# Patient Record
Sex: FEMALE | Race: WHITE
[De-identification: names, ages, dates, MRNs, and addresses within clinical notes are randomized per-mention and may not be internally consistent; named-entity substitution may affect disease eponyms.]

---

## 2021-08-03 ENCOUNTER — HOSPITAL ENCOUNTER (INPATIENT)
Dept: HOSPITAL 12 - ER | Age: 66
LOS: 3 days | Discharge: NURSING, REHAB OR LONG-TERM CARE | DRG: 536 | End: 2021-08-06
Payer: MEDICARE

## 2021-08-03 VITALS — WEIGHT: 150 LBS | HEIGHT: 67 IN | BODY MASS INDEX: 23.54 KG/M2

## 2021-08-03 DIAGNOSIS — Z79.01: ICD-10-CM

## 2021-08-03 DIAGNOSIS — E83.51: ICD-10-CM
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
## 2021-08-03 DIAGNOSIS — Z87.891: ICD-10-CM

## 2021-08-03 DIAGNOSIS — J45.909: ICD-10-CM

## 2021-08-03 DIAGNOSIS — R33.9: ICD-10-CM

## 2021-08-03 DIAGNOSIS — S32.591A: Primary | ICD-10-CM
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
## 2021-08-03 DIAGNOSIS — I48.91: ICD-10-CM

## 2021-08-03 DIAGNOSIS — R90.82: ICD-10-CM

## 2021-08-03 DIAGNOSIS — K59.00: ICD-10-CM

## 2021-08-03 DIAGNOSIS — Z20.822: ICD-10-CM

## 2021-08-03 DIAGNOSIS — S32.511A: ICD-10-CM

## 2021-08-03 DIAGNOSIS — Z90.710: ICD-10-CM

## 2021-08-03 LAB
ALP SERPL-CCNC: 65 U/L (ref 50–136)
ALT SERPL W/O P-5'-P-CCNC: 39 U/L (ref 14–59)
AST SERPL-CCNC: 26 U/L (ref 15–37)
BILIRUB DIRECT SERPL-MCNC: 0.1 MG/DL (ref 0–0.2)
BILIRUB SERPL-MCNC: 0.3 MG/DL (ref 0.2–1)
BUN SERPL-MCNC: 18 MG/DL (ref 7–18)
CHLORIDE SERPL-SCNC: 105 MMOL/L (ref 98–107)
CO2 SERPL-SCNC: 29 MMOL/L (ref 21–32)
CREAT SERPL-MCNC: 1.2 MG/DL (ref 0.6–1.3)
GLUCOSE SERPL-MCNC: 109 MG/DL (ref 74–106)
HCT VFR BLD AUTO: 42 % (ref 31.2–41.9)
MCH RBC QN AUTO: 32.1 UUG (ref 24.7–32.8)
MCV RBC AUTO: 97.4 FL (ref 75.5–95.3)
PLATELET # BLD AUTO: 272 K/UL (ref 179–408)
POTASSIUM SERPL-SCNC: 3.9 MMOL/L (ref 3.5–5.1)
WS STN SPEC: 6.7 G/DL (ref 6.4–8.2)

## 2021-08-03 PROCEDURE — A4663 DIALYSIS BLOOD PRESSURE CUFF: HCPCS

## 2021-08-03 PROCEDURE — G0378 HOSPITAL OBSERVATION PER HR: HCPCS

## 2021-08-03 RX ADMIN — HYDROCODONE BITARTRATE AND ACETAMINOPHEN PRN TAB: 5; 325 TABLET ORAL at 23:10

## 2021-08-04 VITALS — SYSTOLIC BLOOD PRESSURE: 119 MMHG | DIASTOLIC BLOOD PRESSURE: 71 MMHG

## 2021-08-04 VITALS — SYSTOLIC BLOOD PRESSURE: 116 MMHG | DIASTOLIC BLOOD PRESSURE: 71 MMHG

## 2021-08-04 VITALS — SYSTOLIC BLOOD PRESSURE: 113 MMHG | DIASTOLIC BLOOD PRESSURE: 73 MMHG

## 2021-08-04 LAB
APPEARANCE UR: CLEAR
BILIRUB UR QL STRIP: NEGATIVE
BUN SERPL-MCNC: 16 MG/DL (ref 7–18)
CHLORIDE SERPL-SCNC: 104 MMOL/L (ref 98–107)
CHOLEST SERPL-MCNC: 195 MG/DL (ref ?–200)
CO2 SERPL-SCNC: 29 MMOL/L (ref 21–32)
COLOR UR: YELLOW
CREAT SERPL-MCNC: 0.9 MG/DL (ref 0.6–1.3)
GLUCOSE SERPL-MCNC: 111 MG/DL (ref 74–106)
GLUCOSE UR STRIP-MCNC: NEGATIVE MG/DL
HCT VFR BLD AUTO: 39.8 % (ref 31.2–41.9)
HDLC SERPL-MCNC: 93 MG/DL (ref 40–60)
HGB UR QL STRIP: NEGATIVE
KETONES UR STRIP-MCNC: (no result) MG/DL
LEUKOCYTE ESTERASE UR QL STRIP: NEGATIVE
MAGNESIUM SERPL-MCNC: 2 MG/DL (ref 1.8–2.4)
MCH RBC QN AUTO: 33.1 UUG (ref 24.7–32.8)
MCV RBC AUTO: 97.1 FL (ref 75.5–95.3)
NITRITE UR QL STRIP: NEGATIVE
PH UR STRIP: 7 [PH] (ref 5–8)
PHOSPHATE SERPL-MCNC: 3.9 MG/DL (ref 2.5–4.9)
PLATELET # BLD AUTO: 264 K/UL (ref 179–408)
POTASSIUM SERPL-SCNC: 3.7 MMOL/L (ref 3.5–5.1)
SP GR UR STRIP: >=1.03 (ref 1–1.03)
TRIGL SERPL-MCNC: 29 MG/DL (ref 30–150)
TSH SERPL DL<=0.005 MIU/L-ACNC: 3.58 MIU/ML (ref 0.36–3.74)
UROBILINOGEN UR STRIP-MCNC: 0.2 E.U./DL

## 2021-08-04 RX ADMIN — LISINOPRIL SCH MG: 10 TABLET ORAL at 15:00

## 2021-08-04 RX ADMIN — HYDROCODONE BITARTRATE AND ACETAMINOPHEN PRN TAB: 5; 325 TABLET ORAL at 08:48

## 2021-08-04 RX ADMIN — SODIUM CHLORIDE PRN MG: 9 INJECTION, SOLUTION INTRAVENOUS at 11:28

## 2021-08-04 RX ADMIN — PANTOPRAZOLE SODIUM SCH MG: 40 TABLET, DELAYED RELEASE ORAL at 07:00

## 2021-08-04 NOTE — NUR
transfered pt to floor in stable condition. ptremained calm, right hip pain 
when movement. provided bed pan x 3 for the pt. pt refused morphine, saying she 
feels weird with it, agreed with norco.

## 2021-08-04 NOTE — NUR
talked to DR. Garrison over the phone. Dr. garrison said that she already 
contacted Dr. Bradley for Ortho consult.

## 2021-08-04 NOTE — NUR
patient in bed awake and stable, no complains of any sob, pain or discomfort at this time. 
call light kept within reach. will report to oncoming shift.

## 2021-08-05 VITALS — DIASTOLIC BLOOD PRESSURE: 56 MMHG | SYSTOLIC BLOOD PRESSURE: 107 MMHG

## 2021-08-05 VITALS — DIASTOLIC BLOOD PRESSURE: 60 MMHG | SYSTOLIC BLOOD PRESSURE: 105 MMHG

## 2021-08-05 VITALS — SYSTOLIC BLOOD PRESSURE: 110 MMHG | DIASTOLIC BLOOD PRESSURE: 65 MMHG

## 2021-08-05 VITALS — DIASTOLIC BLOOD PRESSURE: 64 MMHG | SYSTOLIC BLOOD PRESSURE: 105 MMHG

## 2021-08-05 LAB
APPEARANCE UR: CLEAR
BILIRUB UR QL STRIP: NEGATIVE
BUN SERPL-MCNC: 10 MG/DL (ref 7–18)
CHLORIDE SERPL-SCNC: 102 MMOL/L (ref 98–107)
CO2 SERPL-SCNC: 28 MMOL/L (ref 21–32)
COLOR UR: YELLOW
CREAT SERPL-MCNC: 0.5 MG/DL (ref 0.6–1.3)
GLUCOSE SERPL-MCNC: 99 MG/DL (ref 74–106)
GLUCOSE UR STRIP-MCNC: NEGATIVE MG/DL
HCT VFR BLD AUTO: 40.5 % (ref 31.2–41.9)
HGB UR QL STRIP: NEGATIVE
KETONES UR STRIP-MCNC: NEGATIVE MG/DL
LEUKOCYTE ESTERASE UR QL STRIP: NEGATIVE
MAGNESIUM SERPL-MCNC: 1.9 MG/DL (ref 1.8–2.4)
MCH RBC QN AUTO: 31.9 UUG (ref 24.7–32.8)
MCV RBC AUTO: 97.4 FL (ref 75.5–95.3)
NITRITE UR QL STRIP: NEGATIVE
PH UR STRIP: 6 [PH] (ref 5–8)
PHOSPHATE SERPL-MCNC: 3 MG/DL (ref 2.5–4.9)
PLATELET # BLD AUTO: 240 K/UL (ref 179–408)
POTASSIUM SERPL-SCNC: 3.6 MMOL/L (ref 3.5–5.1)
SP GR UR STRIP: 1.01 (ref 1–1.03)
UROBILINOGEN UR STRIP-MCNC: 0.2 E.U./DL

## 2021-08-05 RX ADMIN — HYDROCODONE BITARTRATE AND ACETAMINOPHEN PRN TAB: 5; 325 TABLET ORAL at 08:36

## 2021-08-05 RX ADMIN — METOCLOPRAMIDE PRN MG: 5 INJECTION, SOLUTION INTRAMUSCULAR; INTRAVENOUS at 08:51

## 2021-08-05 RX ADMIN — HYDROCODONE BITARTRATE AND ACETAMINOPHEN PRN TAB: 5; 325 TABLET ORAL at 20:21

## 2021-08-05 RX ADMIN — PANTOPRAZOLE SODIUM SCH MG: 40 TABLET, DELAYED RELEASE ORAL at 08:00

## 2021-08-05 RX ADMIN — MONTELUKAST SCH MG: 10 TABLET, FILM COATED ORAL at 10:23

## 2021-08-05 RX ADMIN — METOCLOPRAMIDE PRN MG: 5 INJECTION, SOLUTION INTRAMUSCULAR; INTRAVENOUS at 20:18

## 2021-08-05 RX ADMIN — LISINOPRIL SCH MG: 10 TABLET ORAL at 08:35

## 2021-08-05 NOTE — NUR
RECEIVED CHANGE OF SHIFT REPORT. PT CAME IN WITH C/O RIGHT PELVIC FX. PT IS A/OX4 ON ROOM 
AIR, NO SIGNS OF DISTRESS, REPORTS OF PAIN 4/10, MEDS GIVEN AT ORDERED. PT USING BEDPAN TO 
VOID. IV ACCESS ON THE RIGHT HAND 20G SALINE LOCKED. BED LOW AND LOCKED, CALL LIGHT WITHIN 
REACH, WILL CONTINUE TO MONITOR.

## 2021-08-06 ENCOUNTER — HOSPITAL ENCOUNTER (INPATIENT)
Dept: HOSPITAL 12 - REHABOV3 | Age: 66
LOS: 11 days | Discharge: HOME HEALTH | DRG: 560 | End: 2021-08-17
Attending: PHYSICAL MEDICINE & REHABILITATION | Admitting: PHYSICAL MEDICINE & REHABILITATION
Payer: MEDICARE

## 2021-08-06 VITALS — SYSTOLIC BLOOD PRESSURE: 107 MMHG | DIASTOLIC BLOOD PRESSURE: 78 MMHG

## 2021-08-06 VITALS — BODY MASS INDEX: 23.54 KG/M2 | WEIGHT: 150 LBS | HEIGHT: 67 IN

## 2021-08-06 VITALS — DIASTOLIC BLOOD PRESSURE: 67 MMHG | SYSTOLIC BLOOD PRESSURE: 102 MMHG

## 2021-08-06 VITALS — DIASTOLIC BLOOD PRESSURE: 73 MMHG | SYSTOLIC BLOOD PRESSURE: 106 MMHG

## 2021-08-06 VITALS — SYSTOLIC BLOOD PRESSURE: 114 MMHG | DIASTOLIC BLOOD PRESSURE: 64 MMHG
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
## 2021-08-06 DIAGNOSIS — N39.0: ICD-10-CM

## 2021-08-06 DIAGNOSIS — E87.6: ICD-10-CM

## 2021-08-06 DIAGNOSIS — R90.82: ICD-10-CM

## 2021-08-06 DIAGNOSIS — Z87.891: ICD-10-CM

## 2021-08-06 DIAGNOSIS — I48.91: ICD-10-CM

## 2021-08-06 DIAGNOSIS — J45.909: ICD-10-CM

## 2021-08-06 DIAGNOSIS — R21: ICD-10-CM

## 2021-08-06 DIAGNOSIS — S32.82XD: Primary | ICD-10-CM

## 2021-08-06 DIAGNOSIS — D68.59: ICD-10-CM

## 2021-08-06 LAB
BUN SERPL-MCNC: 11 MG/DL (ref 7–18)
CHLORIDE SERPL-SCNC: 101 MMOL/L (ref 98–107)
CO2 SERPL-SCNC: 29 MMOL/L (ref 21–32)
CREAT SERPL-MCNC: 0.5 MG/DL (ref 0.6–1.3)
GLUCOSE SERPL-MCNC: 99 MG/DL (ref 74–106)
HCT VFR BLD AUTO: 40.8 % (ref 31.2–41.9)
MAGNESIUM SERPL-MCNC: 2 MG/DL (ref 1.8–2.4)
MCH RBC QN AUTO: 31.7 UUG (ref 24.7–32.8)
MCV RBC AUTO: 97.2 FL (ref 75.5–95.3)
PHOSPHATE SERPL-MCNC: 3.6 MG/DL (ref 2.5–4.9)
PLATELET # BLD AUTO: 230 K/UL (ref 179–408)
POTASSIUM SERPL-SCNC: 3.7 MMOL/L (ref 3.5–5.1)

## 2021-08-06 RX ADMIN — HYDROCODONE BITARTRATE AND ACETAMINOPHEN PRN TAB: 5; 325 TABLET ORAL at 02:43

## 2021-08-06 RX ADMIN — LISINOPRIL SCH MG: 10 TABLET ORAL at 09:07

## 2021-08-06 RX ADMIN — HYDROCODONE BITARTRATE AND ACETAMINOPHEN PRN TAB: 5; 325 TABLET ORAL at 22:02

## 2021-08-06 RX ADMIN — PANTOPRAZOLE SODIUM SCH MG: 40 TABLET, DELAYED RELEASE ORAL at 06:30

## 2021-08-06 RX ADMIN — HYDROCODONE BITARTRATE AND ACETAMINOPHEN PRN TAB: 5; 325 TABLET ORAL at 10:26

## 2021-08-06 RX ADMIN — METOCLOPRAMIDE PRN MG: 5 INJECTION, SOLUTION INTRAMUSCULAR; INTRAVENOUS at 02:44

## 2021-08-06 RX ADMIN — Medication PRN MG: at 22:01

## 2021-08-06 RX ADMIN — SODIUM CHLORIDE PRN MG: 9 INJECTION, SOLUTION INTRAVENOUS at 11:18

## 2021-08-06 RX ADMIN — MONTELUKAST SCH MG: 10 TABLET, FILM COATED ORAL at 09:07

## 2021-08-06 NOTE — NUR
Received patient awake, in bed, alert oriented x 4, no acute distress, on room air. 
Peripheral IV line on left forearm, intact and patent, no signs of infection. Arredondo catheter 
in place draining clear yellow urine. No complain of any pain or discomfort. Call light and 
frequently used items placed within patient's reach.

## 2021-08-06 NOTE — NUR
PATIENT GIVEN NORCO 1 TAB PO PRN FOR PAIN, REGLAN 10MG PO PRN FOR NAUSEA AND DULCOLAX SUP 
PRN FOR CONSTIPATION. ALL NEEDS ATTENDED. WILL CONTINUE TO MONITOR AND ASSESS.

## 2021-08-06 NOTE — NUR
Discharge instructions provided to the patient with verbalized understanding. Discharge 
papers signed by patient.  All belongings well accounted for. Patient remains alert, 
oriented x 4, not in any form of distress. She denies any pain or discomfort at this time. 
Vital signs stable. Needs attended to promptly. Discharged patient to ARU. Report given to 
Monique.

## 2021-08-06 NOTE — NUR
RECEIVED PATIENT AWAKE IN BED. PATIENT ADMITTED TO REHAB. MD'S NOTIFIED. PATIENT C/O PAIN IN 
PELVIC AREA. INSTRUCTED THAT WE ARE WAITING FOR ADMISSIONS ORDERS, PATIENT VERBALIZED 
UNDERSTANDING. NO RESP. DISTRESS NOTED. VS WNL. CALL LIGHT IN REACH. ALL NEEDS ATTENDED. 
WILL CONTINUE TO MONITOR AND ASSESS.

## 2021-08-07 VITALS — SYSTOLIC BLOOD PRESSURE: 106 MMHG | DIASTOLIC BLOOD PRESSURE: 66 MMHG

## 2021-08-07 VITALS — DIASTOLIC BLOOD PRESSURE: 61 MMHG | SYSTOLIC BLOOD PRESSURE: 110 MMHG

## 2021-08-07 VITALS — SYSTOLIC BLOOD PRESSURE: 122 MMHG | DIASTOLIC BLOOD PRESSURE: 74 MMHG

## 2021-08-07 VITALS — SYSTOLIC BLOOD PRESSURE: 109 MMHG | DIASTOLIC BLOOD PRESSURE: 66 MMHG

## 2021-08-07 RX ADMIN — HYDROCODONE BITARTRATE AND ACETAMINOPHEN PRN TAB: 5; 325 TABLET ORAL at 21:52

## 2021-08-07 RX ADMIN — Medication PRN MG: at 10:22

## 2021-08-07 RX ADMIN — PANTOPRAZOLE SODIUM SCH MG: 40 TABLET, DELAYED RELEASE ORAL at 06:49

## 2021-08-07 RX ADMIN — LISINOPRIL SCH MG: 10 TABLET ORAL at 10:15

## 2021-08-07 RX ADMIN — Medication PRN MG: at 21:52

## 2021-08-07 RX ADMIN — DOCUSATE SODIUM SCH MG: 100 CAPSULE, LIQUID FILLED ORAL at 10:15

## 2021-08-07 RX ADMIN — HYDROCODONE BITARTRATE AND ACETAMINOPHEN PRN TAB: 5; 325 TABLET ORAL at 10:22

## 2021-08-07 RX ADMIN — FLUTICASONE FUROATE AND VILANTEROL TRIFENATATE SCH EACH: 100; 25 POWDER RESPIRATORY (INHALATION) at 10:15

## 2021-08-07 RX ADMIN — DOCUSATE SODIUM SCH MG: 100 CAPSULE, LIQUID FILLED ORAL at 20:05

## 2021-08-07 RX ADMIN — MONTELUKAST SCH MG: 10 TABLET, FILM COATED ORAL at 10:15

## 2021-08-07 NOTE — NUR
The patient is alert/oriented x4.  No distress noted. Requested PRN pain medication prior to 
therapy, given as ordered.

Safety checks frequently done, and maintained. Call light within reach. Kept patient clean, 
dry, and comfortable. All needs attended. Will continue to monitor for any significant 
changes

## 2021-08-08 VITALS — DIASTOLIC BLOOD PRESSURE: 64 MMHG | SYSTOLIC BLOOD PRESSURE: 115 MMHG

## 2021-08-08 VITALS — DIASTOLIC BLOOD PRESSURE: 68 MMHG | SYSTOLIC BLOOD PRESSURE: 112 MMHG

## 2021-08-08 VITALS — DIASTOLIC BLOOD PRESSURE: 56 MMHG | SYSTOLIC BLOOD PRESSURE: 102 MMHG

## 2021-08-08 VITALS — DIASTOLIC BLOOD PRESSURE: 56 MMHG | SYSTOLIC BLOOD PRESSURE: 111 MMHG

## 2021-08-08 RX ADMIN — DOCUSATE SODIUM SCH MG: 100 CAPSULE, LIQUID FILLED ORAL at 08:20

## 2021-08-08 RX ADMIN — FLUTICASONE FUROATE AND VILANTEROL TRIFENATATE SCH EACH: 100; 25 POWDER RESPIRATORY (INHALATION) at 08:21

## 2021-08-08 RX ADMIN — DOCUSATE SODIUM SCH MG: 100 CAPSULE, LIQUID FILLED ORAL at 20:44

## 2021-08-08 RX ADMIN — RIVAROXABAN SCH MG: 10 TABLET, FILM COATED ORAL at 17:43

## 2021-08-08 RX ADMIN — LISINOPRIL SCH MG: 10 TABLET ORAL at 10:50

## 2021-08-08 RX ADMIN — Medication PRN MG: at 17:43

## 2021-08-08 RX ADMIN — PANTOPRAZOLE SODIUM SCH MG: 40 TABLET, DELAYED RELEASE ORAL at 06:23

## 2021-08-08 RX ADMIN — Medication PRN MG: at 08:20

## 2021-08-08 RX ADMIN — MONTELUKAST SCH MG: 10 TABLET, FILM COATED ORAL at 08:21

## 2021-08-08 RX ADMIN — HYDROCODONE BITARTRATE AND ACETAMINOPHEN PRN TAB: 5; 325 TABLET ORAL at 08:23

## 2021-08-08 NOTE — NUR
Received patient KVNGo X4, resting in bed. VSS, no acute distress noted. C/o pain 8/10 pain, 
administered Norco PRN. Pt complained of nausea administered Reglan PRN. Pt unable to have 
BM administered MOM. Due medication given and tolerated well. Needs attended to.

Pt c/o of itching on back, noticed rash, washed and applied new gown, pt resting comfortable 
at this time. 

Safety measures maintained. Call light placed within reach. Frequent visual checks done. 
Will continue to monitor. 

-------------------------------------------------------------------------------

Addendum: 08/09/21 at 0715 by DAVID JEONG RN RN

-------------------------------------------------------------------------------

wrong time

## 2021-08-08 NOTE — NUR
Pt slept throughout the night. Denies SOB or discomfort at this time. Able to make needs 
known. No distress noted. Safety and comfort provided. No other issues or concerns at this 
time, will endorse to day shift.

## 2021-08-09 VITALS — SYSTOLIC BLOOD PRESSURE: 105 MMHG | DIASTOLIC BLOOD PRESSURE: 63 MMHG

## 2021-08-09 VITALS — DIASTOLIC BLOOD PRESSURE: 61 MMHG | SYSTOLIC BLOOD PRESSURE: 117 MMHG

## 2021-08-09 VITALS — DIASTOLIC BLOOD PRESSURE: 68 MMHG | SYSTOLIC BLOOD PRESSURE: 111 MMHG

## 2021-08-09 VITALS — DIASTOLIC BLOOD PRESSURE: 64 MMHG | SYSTOLIC BLOOD PRESSURE: 112 MMHG

## 2021-08-09 RX ADMIN — RIVAROXABAN SCH MG: 10 TABLET, FILM COATED ORAL at 17:12

## 2021-08-09 RX ADMIN — LISINOPRIL SCH MG: 10 TABLET ORAL at 08:25

## 2021-08-09 RX ADMIN — Medication PRN MG: at 22:29

## 2021-08-09 RX ADMIN — DOCUSATE SODIUM SCH MG: 100 CAPSULE, LIQUID FILLED ORAL at 08:21

## 2021-08-09 RX ADMIN — Medication PRN MG: at 01:04

## 2021-08-09 RX ADMIN — HYDROCODONE BITARTRATE AND ACETAMINOPHEN PRN TAB: 5; 325 TABLET ORAL at 00:45

## 2021-08-09 RX ADMIN — HYDROCODONE BITARTRATE AND ACETAMINOPHEN PRN TAB: 5; 325 TABLET ORAL at 08:24

## 2021-08-09 RX ADMIN — MONTELUKAST SCH MG: 10 TABLET, FILM COATED ORAL at 17:12

## 2021-08-09 RX ADMIN — FLUTICASONE FUROATE AND VILANTEROL TRIFENATATE SCH EACH: 100; 25 POWDER RESPIRATORY (INHALATION) at 08:22

## 2021-08-09 RX ADMIN — DOCUSATE SODIUM SCH MG: 100 CAPSULE, LIQUID FILLED ORAL at 21:03

## 2021-08-09 RX ADMIN — FAMOTIDINE SCH MG: 20 TABLET, FILM COATED ORAL at 08:22

## 2021-08-09 NOTE — NUR
Received patient AXo X4, resting in bed. VSS, no acute distress noted. C/o pain 8/10 pain, 
administered Norco PRN. Pt complained of nausea administered Reglan PRN. Pt unable to have 
BM administered MOM. Due medication given and tolerated well. Needs attended to.

Pt c/o of itching on back, noticed rash, washed and applied new gown, pt resting comfortable 
at this time. 

Safety measures maintained. Call light placed within reach. Frequent visual checks done. 
Will continue to monitor.

## 2021-08-10 VITALS — DIASTOLIC BLOOD PRESSURE: 64 MMHG | SYSTOLIC BLOOD PRESSURE: 120 MMHG

## 2021-08-10 VITALS — DIASTOLIC BLOOD PRESSURE: 66 MMHG | SYSTOLIC BLOOD PRESSURE: 114 MMHG

## 2021-08-10 VITALS — DIASTOLIC BLOOD PRESSURE: 68 MMHG | SYSTOLIC BLOOD PRESSURE: 100 MMHG

## 2021-08-10 VITALS — SYSTOLIC BLOOD PRESSURE: 97 MMHG | DIASTOLIC BLOOD PRESSURE: 62 MMHG

## 2021-08-10 LAB
APPEARANCE UR: (no result)
BILIRUB UR QL STRIP: NEGATIVE
COLOR UR: YELLOW
DEPRECATED SQUAMOUS URNS QL MICRO: (no result) /HPF
GLUCOSE UR STRIP-MCNC: NEGATIVE MG/DL
HGB UR QL STRIP: (no result)
KETONES UR STRIP-MCNC: NEGATIVE MG/DL
LEUKOCYTE ESTERASE UR QL STRIP: (no result)
NITRITE UR QL STRIP: POSITIVE
PH UR STRIP: 7 [PH] (ref 5–8)
RBC #/AREA URNS HPF: (no result) /HPF (ref 0–3)
SP GR UR STRIP: 1.02 (ref 1–1.03)
UROBILINOGEN UR STRIP-MCNC: 0.2 E.U./DL
WBC #/AREA URNS HPF: (no result) /HPF
WBC #/AREA URNS HPF: (no result) /HPF (ref 0–3)

## 2021-08-10 RX ADMIN — FLUTICASONE FUROATE AND VILANTEROL TRIFENATATE SCH EACH: 100; 25 POWDER RESPIRATORY (INHALATION) at 08:50

## 2021-08-10 RX ADMIN — DOCUSATE SODIUM SCH MG: 100 CAPSULE, LIQUID FILLED ORAL at 08:55

## 2021-08-10 RX ADMIN — Medication PRN MG: at 12:50

## 2021-08-10 RX ADMIN — DOCUSATE SODIUM SCH MG: 100 CAPSULE, LIQUID FILLED ORAL at 20:52

## 2021-08-10 RX ADMIN — RIVAROXABAN SCH MG: 10 TABLET, FILM COATED ORAL at 17:25

## 2021-08-10 RX ADMIN — HYDROCODONE BITARTRATE AND ACETAMINOPHEN PRN TAB: 5; 325 TABLET ORAL at 12:50

## 2021-08-10 RX ADMIN — Medication PRN MG: at 20:52

## 2021-08-10 RX ADMIN — Medication SCH EACH: at 10:44

## 2021-08-10 RX ADMIN — MONTELUKAST SCH MG: 10 TABLET, FILM COATED ORAL at 17:24

## 2021-08-10 RX ADMIN — Medication SCH MG: at 21:01

## 2021-08-10 RX ADMIN — LISINOPRIL SCH MG: 10 TABLET ORAL at 08:55

## 2021-08-10 RX ADMIN — Medication SCH MG: at 10:45

## 2021-08-10 RX ADMIN — FAMOTIDINE SCH MG: 20 TABLET, FILM COATED ORAL at 08:51

## 2021-08-10 RX ADMIN — Medication SCH EACH: at 20:52

## 2021-08-10 NOTE — NUR
AAOx4 OOB to bedside commode with assist. Voiding well. No BM 

noted this shift. VSS. Needs attended. Admitted for pelvic fracture

but no surgical intervention noted. Denies any pain nor any discomfort.

Will monitor patient. Fall precautions maintained. Siderails up for

safety. No acute distress noted.

## 2021-08-10 NOTE — NUR
Patient requested sleeping medication, received order for Ambien 5mg PO PRN HS. 
Administered, effective. Assisted pt to bedside commode multiple times throughout the night. 
Pt had 4 small BM. Pt c/o of burning upon urination, recieved order to collect specimen, 
sent to lab. Pt slept throughout the night. Denies SOB or discomfort at this time. Able to 
make needs known. No distress noted. Safety and comfort provided. will continue plan of care 
and endorse to day shift.

## 2021-08-11 VITALS — DIASTOLIC BLOOD PRESSURE: 66 MMHG | SYSTOLIC BLOOD PRESSURE: 112 MMHG

## 2021-08-11 VITALS — SYSTOLIC BLOOD PRESSURE: 103 MMHG | DIASTOLIC BLOOD PRESSURE: 59 MMHG

## 2021-08-11 VITALS — DIASTOLIC BLOOD PRESSURE: 62 MMHG | SYSTOLIC BLOOD PRESSURE: 110 MMHG

## 2021-08-11 VITALS — SYSTOLIC BLOOD PRESSURE: 105 MMHG | DIASTOLIC BLOOD PRESSURE: 62 MMHG

## 2021-08-11 LAB
BUN SERPL-MCNC: 15 MG/DL (ref 7–18)
CHLORIDE SERPL-SCNC: 100 MMOL/L (ref 98–107)
CO2 SERPL-SCNC: 30 MMOL/L (ref 21–32)
CREAT SERPL-MCNC: 0.5 MG/DL (ref 0.6–1.3)
GLUCOSE SERPL-MCNC: 96 MG/DL (ref 74–106)
HCT VFR BLD AUTO: 35.4 % (ref 31.2–41.9)
MCH RBC QN AUTO: 32.9 UUG (ref 24.7–32.8)
MCV RBC AUTO: 97 FL (ref 75.5–95.3)
PLATELET # BLD AUTO: 300 K/UL (ref 179–408)
POTASSIUM SERPL-SCNC: 3.1 MMOL/L (ref 3.5–5.1)

## 2021-08-11 RX ADMIN — Medication PRN MG: at 21:37

## 2021-08-11 RX ADMIN — LISINOPRIL SCH MG: 10 TABLET ORAL at 08:29

## 2021-08-11 RX ADMIN — Medication SCH MG: at 08:29

## 2021-08-11 RX ADMIN — FAMOTIDINE SCH MG: 20 TABLET, FILM COATED ORAL at 08:28

## 2021-08-11 RX ADMIN — RIVAROXABAN SCH MG: 10 TABLET, FILM COATED ORAL at 18:05

## 2021-08-11 RX ADMIN — Medication SCH EACH: at 21:33

## 2021-08-11 RX ADMIN — FLUTICASONE FUROATE AND VILANTEROL TRIFENATATE SCH EACH: 100; 25 POWDER RESPIRATORY (INHALATION) at 08:35

## 2021-08-11 RX ADMIN — DOCUSATE SODIUM SCH MG: 100 CAPSULE, LIQUID FILLED ORAL at 21:00

## 2021-08-11 RX ADMIN — Medication SCH MG: at 21:33

## 2021-08-11 RX ADMIN — DOCUSATE SODIUM SCH MG: 100 CAPSULE, LIQUID FILLED ORAL at 08:28

## 2021-08-11 RX ADMIN — Medication SCH EACH: at 08:28

## 2021-08-11 RX ADMIN — MONTELUKAST SCH MG: 10 TABLET, FILM COATED ORAL at 18:03

## 2021-08-12 VITALS — DIASTOLIC BLOOD PRESSURE: 63 MMHG | SYSTOLIC BLOOD PRESSURE: 117 MMHG

## 2021-08-12 VITALS — SYSTOLIC BLOOD PRESSURE: 123 MMHG | DIASTOLIC BLOOD PRESSURE: 68 MMHG

## 2021-08-12 VITALS — SYSTOLIC BLOOD PRESSURE: 105 MMHG | DIASTOLIC BLOOD PRESSURE: 84 MMHG

## 2021-08-12 VITALS — SYSTOLIC BLOOD PRESSURE: 132 MMHG | DIASTOLIC BLOOD PRESSURE: 52 MMHG

## 2021-08-12 LAB
BUN SERPL-MCNC: 12 MG/DL (ref 7–18)
CHLORIDE SERPL-SCNC: 102 MMOL/L (ref 98–107)
CO2 SERPL-SCNC: 30 MMOL/L (ref 21–32)
CREAT SERPL-MCNC: 0.5 MG/DL (ref 0.6–1.3)
GLUCOSE SERPL-MCNC: 88 MG/DL (ref 74–106)
POTASSIUM SERPL-SCNC: 3.7 MMOL/L (ref 3.5–5.1)

## 2021-08-12 RX ADMIN — FAMOTIDINE SCH MG: 20 TABLET, FILM COATED ORAL at 08:21

## 2021-08-12 RX ADMIN — DOCUSATE SODIUM SCH MG: 100 CAPSULE, LIQUID FILLED ORAL at 20:18

## 2021-08-12 RX ADMIN — MONTELUKAST SCH MG: 10 TABLET, FILM COATED ORAL at 17:05

## 2021-08-12 RX ADMIN — Medication SCH MG: at 08:21

## 2021-08-12 RX ADMIN — RIVAROXABAN SCH MG: 10 TABLET, FILM COATED ORAL at 17:06

## 2021-08-12 RX ADMIN — LISINOPRIL SCH MG: 10 TABLET ORAL at 08:22

## 2021-08-12 RX ADMIN — FLUTICASONE FUROATE AND VILANTEROL TRIFENATATE SCH EACH: 100; 25 POWDER RESPIRATORY (INHALATION) at 08:26

## 2021-08-12 RX ADMIN — Medication SCH EACH: at 08:23

## 2021-08-12 RX ADMIN — Medication SCH EACH: at 20:03

## 2021-08-12 RX ADMIN — DOCUSATE SODIUM SCH MG: 100 CAPSULE, LIQUID FILLED ORAL at 08:21

## 2021-08-12 NOTE — NUR
Received patient awake on bed, watching TV with no respiratory distress noted. A/Ox4, able 
to make needs known. Denies pain and discomfort. Resident refused to take Bactrim DS and 
wants to go back on Cipro 250 mg. She stated that she had problems taking antibiotic in the 
past d/t C-diff. Dr. Rodrigues informed and ordered to switch back to Cipro if patient also 
does not want Macrobid. Explained to patient that per Dr. Rodrigues, Macrobid is less 
sensitive and will work faster. Patient still opted for Cipro. All orders noted and carried 
out. All needs attended. Call light placed within reach. Frequent visual checks done. Will 
continue to monitor.

## 2021-08-12 NOTE — NUR
NSG: RESTING IN BED COMFORTABLY. NO C/O PAIN OR DISCOMFORT. ASSISTED TO USE BATHROOM. KEPT 
CLEAN AND DRY.

## 2021-08-12 NOTE — NUR
NSG: Received patient lying in bed. AXo X4, resting in bed. VSS, no acute distress noted. no 
c/o pain or discomfort at this time. Due medication given and tolerated well. Needs attended 
to.

Pt c/o of itching on back, noticed rash, pt resting comfortable at this time. Safety 
measures maintained. Call light placed within reach. Frequent visual checks done. Will 
continue to monitor.

## 2021-08-13 VITALS — SYSTOLIC BLOOD PRESSURE: 106 MMHG | DIASTOLIC BLOOD PRESSURE: 59 MMHG

## 2021-08-13 VITALS — SYSTOLIC BLOOD PRESSURE: 118 MMHG | DIASTOLIC BLOOD PRESSURE: 50 MMHG

## 2021-08-13 VITALS — SYSTOLIC BLOOD PRESSURE: 108 MMHG | DIASTOLIC BLOOD PRESSURE: 73 MMHG

## 2021-08-13 RX ADMIN — NITROFURANTOIN (MONOHYDRATE/MACROCRYSTALS) SCH MG: 75; 25 CAPSULE ORAL at 11:58

## 2021-08-13 RX ADMIN — DOCUSATE SODIUM SCH MG: 100 CAPSULE, LIQUID FILLED ORAL at 20:32

## 2021-08-13 RX ADMIN — RIVAROXABAN SCH MG: 10 TABLET, FILM COATED ORAL at 18:36

## 2021-08-13 RX ADMIN — FLUTICASONE FUROATE AND VILANTEROL TRIFENATATE SCH EACH: 100; 25 POWDER RESPIRATORY (INHALATION) at 10:51

## 2021-08-13 RX ADMIN — Medication PRN MG: at 21:53

## 2021-08-13 RX ADMIN — Medication SCH EACH: at 10:50

## 2021-08-13 RX ADMIN — MONTELUKAST SCH MG: 10 TABLET, FILM COATED ORAL at 18:33

## 2021-08-13 RX ADMIN — DOCUSATE SODIUM SCH MG: 100 CAPSULE, LIQUID FILLED ORAL at 09:00

## 2021-08-13 RX ADMIN — FAMOTIDINE SCH MG: 20 TABLET, FILM COATED ORAL at 10:50

## 2021-08-13 RX ADMIN — LISINOPRIL SCH MG: 10 TABLET ORAL at 10:51

## 2021-08-13 RX ADMIN — Medication SCH EACH: at 20:32

## 2021-08-13 RX ADMIN — ACETAMINOPHEN PRN MG: 325 TABLET ORAL at 11:59

## 2021-08-13 RX ADMIN — NITROFURANTOIN (MONOHYDRATE/MACROCRYSTALS) SCH MG: 75; 25 CAPSULE ORAL at 21:47

## 2021-08-13 NOTE — NUR
Patient received to care awake, alert x4. No distress, denies pain. Pt is cooperative with 
care, able to state her needs. safety precautions are in place. Pt awaits therapy.

## 2021-08-13 NOTE — NUR
Patient slept intermittently throughout the night, easily arousable and able to make needs 
known. Denies pain and discomfort at this time. All needs attended. Call light placed within 
reach. Frequent visual checks one. Will endorse to next shift for continuity of care.

## 2021-08-14 VITALS — SYSTOLIC BLOOD PRESSURE: 112 MMHG | DIASTOLIC BLOOD PRESSURE: 72 MMHG

## 2021-08-14 VITALS — SYSTOLIC BLOOD PRESSURE: 104 MMHG | DIASTOLIC BLOOD PRESSURE: 66 MMHG

## 2021-08-14 VITALS — DIASTOLIC BLOOD PRESSURE: 63 MMHG | SYSTOLIC BLOOD PRESSURE: 118 MMHG

## 2021-08-14 VITALS — SYSTOLIC BLOOD PRESSURE: 118 MMHG | DIASTOLIC BLOOD PRESSURE: 64 MMHG

## 2021-08-14 RX ADMIN — RIVAROXABAN SCH MG: 10 TABLET, FILM COATED ORAL at 17:07

## 2021-08-14 RX ADMIN — FLUTICASONE FUROATE AND VILANTEROL TRIFENATATE SCH EACH: 100; 25 POWDER RESPIRATORY (INHALATION) at 08:17

## 2021-08-14 RX ADMIN — DOCUSATE SODIUM SCH MG: 100 CAPSULE, LIQUID FILLED ORAL at 08:18

## 2021-08-14 RX ADMIN — MONTELUKAST SCH MG: 10 TABLET, FILM COATED ORAL at 17:07

## 2021-08-14 RX ADMIN — Medication SCH EACH: at 20:06

## 2021-08-14 RX ADMIN — FAMOTIDINE SCH MG: 20 TABLET, FILM COATED ORAL at 08:15

## 2021-08-14 RX ADMIN — NITROFURANTOIN (MONOHYDRATE/MACROCRYSTALS) SCH MG: 75; 25 CAPSULE ORAL at 08:15

## 2021-08-14 RX ADMIN — Medication SCH EACH: at 08:15

## 2021-08-14 RX ADMIN — Medication PRN MG: at 23:13

## 2021-08-14 RX ADMIN — LISINOPRIL SCH MG: 10 TABLET ORAL at 08:16

## 2021-08-14 RX ADMIN — DOCUSATE SODIUM SCH MG: 100 CAPSULE, LIQUID FILLED ORAL at 20:19

## 2021-08-14 RX ADMIN — ACETAMINOPHEN PRN MG: 325 TABLET ORAL at 11:58

## 2021-08-14 NOTE — NUR
VS stable. No complaint of pain presented all night. Benadryl given for itchiness with 
relief. No further complaint presented. Slept good. All needs attended and met. Continue 
care as planned.

## 2021-08-14 NOTE — NUR
Patient is complaining of itchiness on arms due to Macrobid. Patient is not complaining of 
sob. minimal redness noted on both forearms. Offered Benadryl but patient refused. Applied 
hydrocortisone. Notified Dr. Rodrigues.

## 2021-08-14 NOTE — NUR
Received patient in bed, alert and oriented x 4, in room air saturating at 98%. VS WNL. 
Patient able to follow simple commands, pleasant and cooperative upon assessment. All due 
meds given per MD order. Placed call light within easy reach. All needs met in a timely 
manner.

## 2021-08-14 NOTE — NUR
Received pt awake on bed, no respiratory distress noted. A/O x4, able to make needs known. 
Complaint of itchiness/rashes on both arms, upper chest, and upper back. Per pt, it started 
30 min after taking Macrobid in the morning. It goes away for a little back, then comes 
back. Diphenhydramine PRN given. Informed Dr. Rodrigues, with order to stop Macrobid and do 
urine culture. Went back to check pt after 30 min, stated that Diphenhydramine is starting 
to work. Informed pt that urine sample is needed, verbalized understanding. She is watching 
on her Ipad with no acute distress noted. All needs attended. Call light placed within 
reach. Frequent visual checks done. Will continue to monitor.

## 2021-08-15 VITALS — DIASTOLIC BLOOD PRESSURE: 57 MMHG | SYSTOLIC BLOOD PRESSURE: 107 MMHG

## 2021-08-15 VITALS — SYSTOLIC BLOOD PRESSURE: 114 MMHG | DIASTOLIC BLOOD PRESSURE: 67 MMHG

## 2021-08-15 LAB
APPEARANCE UR: CLEAR
BILIRUB UR QL STRIP: NEGATIVE
COLOR UR: YELLOW
GLUCOSE UR STRIP-MCNC: NEGATIVE MG/DL
HGB UR QL STRIP: NEGATIVE
KETONES UR STRIP-MCNC: NEGATIVE MG/DL
LEUKOCYTE ESTERASE UR QL STRIP: NEGATIVE
NITRITE UR QL STRIP: NEGATIVE
PH UR STRIP: 6 [PH] (ref 5–8)
SP GR UR STRIP: 1.01 (ref 1–1.03)
UROBILINOGEN UR STRIP-MCNC: 0.2 E.U./DL

## 2021-08-15 RX ADMIN — ACETAMINOPHEN PRN MG: 325 TABLET ORAL at 12:42

## 2021-08-15 RX ADMIN — FLUTICASONE FUROATE AND VILANTEROL TRIFENATATE SCH EACH: 100; 25 POWDER RESPIRATORY (INHALATION) at 08:22

## 2021-08-15 RX ADMIN — LISINOPRIL SCH MG: 10 TABLET ORAL at 08:23

## 2021-08-15 RX ADMIN — Medication SCH EACH: at 20:07

## 2021-08-15 RX ADMIN — FAMOTIDINE SCH MG: 20 TABLET, FILM COATED ORAL at 08:23

## 2021-08-15 RX ADMIN — DOCUSATE SODIUM SCH MG: 100 CAPSULE, LIQUID FILLED ORAL at 08:25

## 2021-08-15 RX ADMIN — RIVAROXABAN SCH MG: 10 TABLET, FILM COATED ORAL at 18:58

## 2021-08-15 RX ADMIN — MONTELUKAST SCH MG: 10 TABLET, FILM COATED ORAL at 18:58

## 2021-08-15 RX ADMIN — Medication PRN MG: at 22:22

## 2021-08-15 RX ADMIN — DOCUSATE SODIUM SCH MG: 100 CAPSULE, LIQUID FILLED ORAL at 20:09

## 2021-08-15 RX ADMIN — Medication SCH EACH: at 08:23

## 2021-08-15 NOTE — NUR
Received pt resting in bed. AAO x4. No acute distress noted. Denies pain/ discomfort. Due 
med given as ordered. Pt refused colace, risks and benefits explained, pt still refused. C/o 
itchiness on the left arm, PRN Benadryl given. Safety measures maintained. Call light and 
personal items within reach. Will continue to monitor.

## 2021-08-15 NOTE — NUR
Patient given another diphenhydramine PRN d/t itchiness/rashes at 0314H, noted effective. 
Will endorse for continuity of care.

-------------------------------------------------------------------------------

Addendum: 08/15/21 at 0652 by SANTOS GRANT RN

-------------------------------------------------------------------------------

Urine sample collected, urine culture still pending.

## 2021-08-15 NOTE — NUR
Patient able to follow simple commands, pleasant and cooperative upon assessment. Patient in 
room air saturating at 98% All due meds given per MD order. Placed call light within easy 
reach. Went off the unit for therapy for an hour. Kept patient comfortable.

## 2021-08-16 VITALS — DIASTOLIC BLOOD PRESSURE: 57 MMHG | SYSTOLIC BLOOD PRESSURE: 112 MMHG

## 2021-08-16 VITALS — SYSTOLIC BLOOD PRESSURE: 107 MMHG | DIASTOLIC BLOOD PRESSURE: 59 MMHG

## 2021-08-16 VITALS — DIASTOLIC BLOOD PRESSURE: 56 MMHG | SYSTOLIC BLOOD PRESSURE: 103 MMHG

## 2021-08-16 VITALS — SYSTOLIC BLOOD PRESSURE: 94 MMHG | DIASTOLIC BLOOD PRESSURE: 55 MMHG

## 2021-08-16 RX ADMIN — FLUTICASONE FUROATE AND VILANTEROL TRIFENATATE SCH EACH: 100; 25 POWDER RESPIRATORY (INHALATION) at 08:38

## 2021-08-16 RX ADMIN — Medication SCH EACH: at 08:35

## 2021-08-16 RX ADMIN — FAMOTIDINE SCH MG: 20 TABLET, FILM COATED ORAL at 08:35

## 2021-08-16 RX ADMIN — LISINOPRIL SCH MG: 10 TABLET ORAL at 08:38

## 2021-08-16 RX ADMIN — DOCUSATE SODIUM SCH MG: 100 CAPSULE, LIQUID FILLED ORAL at 20:34

## 2021-08-16 RX ADMIN — ACETAMINOPHEN PRN MG: 325 TABLET ORAL at 09:33

## 2021-08-16 RX ADMIN — DOCUSATE SODIUM SCH MG: 100 CAPSULE, LIQUID FILLED ORAL at 08:48

## 2021-08-16 RX ADMIN — Medication SCH EACH: at 20:33

## 2021-08-16 RX ADMIN — MONTELUKAST SCH MG: 10 TABLET, FILM COATED ORAL at 17:13

## 2021-08-16 RX ADMIN — Medication PRN MG: at 22:06

## 2021-08-16 RX ADMIN — RIVAROXABAN SCH MG: 10 TABLET, FILM COATED ORAL at 17:13

## 2021-08-16 NOTE — NUR
Pt in bed and having her breakfast. AAO x4. No acute distress noted. C/o mild pain, Tylenol 
PRN pain med given as ordered. Other due meds given as ordered. Pt refused Colace, risks and 
benefits explained, pt still refused. Safety measures maintained. Call light and personal 
items within reach. Will continue to monitor.

## 2021-08-17 VITALS — SYSTOLIC BLOOD PRESSURE: 102 MMHG | DIASTOLIC BLOOD PRESSURE: 68 MMHG

## 2021-08-17 VITALS — SYSTOLIC BLOOD PRESSURE: 105 MMHG | DIASTOLIC BLOOD PRESSURE: 68 MMHG

## 2021-08-17 VITALS — DIASTOLIC BLOOD PRESSURE: 61 MMHG | SYSTOLIC BLOOD PRESSURE: 101 MMHG

## 2021-08-17 RX ADMIN — DOCUSATE SODIUM SCH MG: 100 CAPSULE, LIQUID FILLED ORAL at 09:00

## 2021-08-17 RX ADMIN — FLUTICASONE FUROATE AND VILANTEROL TRIFENATATE SCH EACH: 100; 25 POWDER RESPIRATORY (INHALATION) at 09:02

## 2021-08-17 RX ADMIN — LISINOPRIL SCH MG: 10 TABLET ORAL at 09:03

## 2021-08-17 RX ADMIN — FAMOTIDINE SCH MG: 20 TABLET, FILM COATED ORAL at 09:02

## 2021-08-17 RX ADMIN — Medication SCH EACH: at 09:02
